# Patient Record
Sex: FEMALE | Race: WHITE | Employment: FULL TIME | URBAN - METROPOLITAN AREA
[De-identification: names, ages, dates, MRNs, and addresses within clinical notes are randomized per-mention and may not be internally consistent; named-entity substitution may affect disease eponyms.]

---

## 2019-11-23 ENCOUNTER — APPOINTMENT (OUTPATIENT)
Dept: ULTRASOUND IMAGING | Age: 24
End: 2019-11-23
Attending: EMERGENCY MEDICINE
Payer: COMMERCIAL

## 2019-11-23 ENCOUNTER — HOSPITAL ENCOUNTER (EMERGENCY)
Age: 24
Discharge: HOME | End: 2019-11-24
Attending: EMERGENCY MEDICINE
Payer: COMMERCIAL

## 2019-11-23 DIAGNOSIS — O20.0 THREATENED MISCARRIAGE: Primary | ICD-10-CM

## 2019-11-23 LAB
APPEARANCE UR: CLEAR
BACTERIA URNS QL MICRO: NEGATIVE /HPF
BASOPHILS # BLD: 0.1 K/UL (ref 0–0.1)
BASOPHILS NFR BLD: 1 % (ref 0–1)
BILIRUB UR QL: NEGATIVE
CLUE CELLS VAG QL WET PREP: NORMAL
COLOR UR: ABNORMAL
DIFFERENTIAL METHOD BLD: NORMAL
EOSINOPHIL # BLD: 0.1 K/UL (ref 0–0.4)
EOSINOPHIL NFR BLD: 1 % (ref 0–7)
EPITH CASTS URNS QL MICRO: ABNORMAL /LPF
ERYTHROCYTE [DISTWIDTH] IN BLOOD BY AUTOMATED COUNT: 13.9 % (ref 11.5–14.5)
GLUCOSE UR STRIP.AUTO-MCNC: NEGATIVE MG/DL
HCG SERPL-ACNC: 9184 MIU/ML (ref 0–6)
HCT VFR BLD AUTO: 38.9 % (ref 35–47)
HGB BLD-MCNC: 12.6 G/DL (ref 11.5–16)
HGB UR QL STRIP: ABNORMAL
HYALINE CASTS URNS QL MICRO: ABNORMAL /LPF (ref 0–5)
IMM GRANULOCYTES # BLD AUTO: 0 K/UL (ref 0–0.04)
IMM GRANULOCYTES NFR BLD AUTO: 0 % (ref 0–0.5)
KETONES UR QL STRIP.AUTO: NEGATIVE MG/DL
KOH PREP SPEC: NORMAL
LEUKOCYTE ESTERASE UR QL STRIP.AUTO: NEGATIVE
LYMPHOCYTES # BLD: 3 K/UL (ref 0.8–3.5)
LYMPHOCYTES NFR BLD: 30 % (ref 12–49)
MCH RBC QN AUTO: 26.8 PG (ref 26–34)
MCHC RBC AUTO-ENTMCNC: 32.4 G/DL (ref 30–36.5)
MCV RBC AUTO: 82.6 FL (ref 80–99)
MONOCYTES # BLD: 0.5 K/UL (ref 0–1)
MONOCYTES NFR BLD: 5 % (ref 5–13)
NEUTS SEG # BLD: 6.5 K/UL (ref 1.8–8)
NEUTS SEG NFR BLD: 63 % (ref 32–75)
NITRITE UR QL STRIP.AUTO: NEGATIVE
NRBC # BLD: 0 K/UL (ref 0–0.01)
NRBC BLD-RTO: 0 PER 100 WBC
PH UR STRIP: 6 [PH] (ref 5–8)
PLATELET # BLD AUTO: 363 K/UL (ref 150–400)
PMV BLD AUTO: 10.3 FL (ref 8.9–12.9)
PROT UR STRIP-MCNC: NEGATIVE MG/DL
RBC # BLD AUTO: 4.71 M/UL (ref 3.8–5.2)
RBC #/AREA URNS HPF: ABNORMAL /HPF (ref 0–5)
SERVICE CMNT-IMP: NORMAL
SP GR UR REFRACTOMETRY: 1.03 (ref 1–1.03)
T VAGINALIS VAG QL WET PREP: NORMAL
UR CULT HOLD, URHOLD: NORMAL
UROBILINOGEN UR QL STRIP.AUTO: 1 EU/DL (ref 0.2–1)
WBC # BLD AUTO: 10.3 K/UL (ref 3.6–11)
WBC URNS QL MICRO: ABNORMAL /HPF (ref 0–4)

## 2019-11-23 PROCEDURE — 99283 EMERGENCY DEPT VISIT LOW MDM: CPT

## 2019-11-23 PROCEDURE — 81001 URINALYSIS AUTO W/SCOPE: CPT

## 2019-11-23 PROCEDURE — 76817 TRANSVAGINAL US OBSTETRIC: CPT

## 2019-11-23 PROCEDURE — 87210 SMEAR WET MOUNT SALINE/INK: CPT

## 2019-11-23 PROCEDURE — 36415 COLL VENOUS BLD VENIPUNCTURE: CPT

## 2019-11-23 PROCEDURE — 76801 OB US < 14 WKS SINGLE FETUS: CPT

## 2019-11-23 PROCEDURE — 86900 BLOOD TYPING SEROLOGIC ABO: CPT

## 2019-11-23 PROCEDURE — 87491 CHLMYD TRACH DNA AMP PROBE: CPT

## 2019-11-23 PROCEDURE — 85025 COMPLETE CBC W/AUTO DIFF WBC: CPT

## 2019-11-23 PROCEDURE — 84702 CHORIONIC GONADOTROPIN TEST: CPT

## 2019-11-23 RX ORDER — SODIUM CHLORIDE 0.9 % (FLUSH) 0.9 %
5-40 SYRINGE (ML) INJECTION EVERY 8 HOURS
Status: DISCONTINUED | OUTPATIENT
Start: 2019-11-23 | End: 2019-11-24 | Stop reason: HOSPADM

## 2019-11-23 RX ORDER — SODIUM CHLORIDE 0.9 % (FLUSH) 0.9 %
5-40 SYRINGE (ML) INJECTION AS NEEDED
Status: DISCONTINUED | OUTPATIENT
Start: 2019-11-23 | End: 2019-11-24 | Stop reason: HOSPADM

## 2019-11-24 VITALS
HEART RATE: 105 BPM | DIASTOLIC BLOOD PRESSURE: 57 MMHG | OXYGEN SATURATION: 98 % | TEMPERATURE: 99.6 F | HEIGHT: 66 IN | WEIGHT: 235 LBS | RESPIRATION RATE: 18 BRPM | BODY MASS INDEX: 37.77 KG/M2 | SYSTOLIC BLOOD PRESSURE: 125 MMHG

## 2019-11-24 LAB
ABO + RH BLD: NORMAL
BLOOD BANK CMNT PATIENT-IMP: NORMAL

## 2019-11-24 NOTE — ED TRIAGE NOTES
Patient is 6 wks preg. States that she started cramping 2 days ago, with it getting progressively worse, and today states that she started bleeding. States that she was spotting a couple of days ago, but a couple of hours ago she had bright red blood when she went to the restroom.

## 2019-11-24 NOTE — ED PROVIDER NOTES
G1, ; healthy; 6 weeks pregnant; presents accompanied by her fiancé with complaints of lower abdominal cramping and vaginal bleeding. She states she has had mild cramping for the past week, but it worsened last night. It is in her lower abdomen (right lower quadrant greater than left lower quadrant). She states that she has been spotting for the past 4 days. However, she had a biopsy done 4 days ago and soon the spotting was due to that. However, she noted some bright red bleeding tonight after wiping. She has not had an ultrasound this pregnancy. No past medical history on file. No past surgical history on file. No family history on file.     Social History     Socioeconomic History    Marital status: SINGLE     Spouse name: Not on file    Number of children: Not on file    Years of education: Not on file    Highest education level: Not on file   Occupational History    Not on file   Social Needs    Financial resource strain: Not on file    Food insecurity:     Worry: Not on file     Inability: Not on file    Transportation needs:     Medical: Not on file     Non-medical: Not on file   Tobacco Use    Smoking status: Not on file   Substance and Sexual Activity    Alcohol use: Not on file    Drug use: Not on file    Sexual activity: Not on file   Lifestyle    Physical activity:     Days per week: Not on file     Minutes per session: Not on file    Stress: Not on file   Relationships    Social connections:     Talks on phone: Not on file     Gets together: Not on file     Attends Yazdanism service: Not on file     Active member of club or organization: Not on file     Attends meetings of clubs or organizations: Not on file     Relationship status: Not on file    Intimate partner violence:     Fear of current or ex partner: Not on file     Emotionally abused: Not on file     Physically abused: Not on file     Forced sexual activity: Not on file   Other Topics Concern    Not on file   Social History Narrative    Not on file         ALLERGIES: Tramadol    Review of Systems   All other systems reviewed and are negative. Vitals:    11/23/19 2206   BP: 152/78   Pulse: (!) 105   Resp: 18   Temp: 99.6 °F (37.6 °C)   SpO2: 97%   Weight: 106.6 kg (235 lb)   Height: 5' 6\" (1.676 m)            Physical Exam  Vitals signs and nursing note reviewed. Constitutional:       Appearance: She is well-developed. Comments: Overweight   HENT:      Head: Normocephalic and atraumatic. Eyes:      Conjunctiva/sclera: Conjunctivae normal.   Neck:      Musculoskeletal: Neck supple. Trachea: No tracheal deviation. Cardiovascular:      Rate and Rhythm: Normal rate and regular rhythm. Heart sounds: Normal heart sounds. No murmur. No friction rub. No gallop. Pulmonary:      Effort: Pulmonary effort is normal.      Breath sounds: Normal breath sounds. Abdominal:      Palpations: Abdomen is soft. Comments: Mild lower abdominal tenderness   Musculoskeletal:         General: No deformity. Skin:     General: Skin is warm and dry. Neurological:      Mental Status: She is alert. Comments: oriented     Pelvic exam: Very minimal amount of brown blood noted in the vault. Cervix is closed and normal-appearing. Mild uterine tenderness. MDM       Procedures    Progress Note:  Results, treatment, and follow up plan have been discussed with patient/fiancé. Questions were answered. Curly Acevedo MD  12:39 AM    Assessment/plan: 6 weeks pregnant presents with cramping and some mild bleeding -threatened miscarriage; quant is 9100; ultrasound shows 6 weeks IUP; a positive blood type; reassuring appearance and exam; VSS; OB follow-up.   Curly Acevedo MD  12:41 AM

## 2019-11-24 NOTE — DISCHARGE INSTRUCTIONS
Patient Education        Threatened Miscarriage: Care Instructions  Your Care Instructions    Some women have light spotting or bleeding during the first 12 weeks of pregnancy. In some cases this is normal. Light spotting or bleeding can also be a sign of a possible loss of the pregnancy. This is called a threatened miscarriage. At this point, the doctor may not be able to tell if your vaginal bleeding is normal or is a sign of a miscarriage. In early pregnancy, things such as stress, exercise, and sex do not cause miscarriage. You may be worried or upset about the possibility of losing your pregnancy. But do not blame yourself. There is no treatment to stop a threatened miscarriage. If you do have a miscarriage, there was nothing you could have done to prevent it. A miscarriage usually means that the pregnancy is not developing normally. The doctor has checked you carefully, but problems can develop later. If you notice any problems or new symptoms, get medical treatment right away. Follow-up care is a key part of your treatment and safety. Be sure to make and go to all appointments, and call your doctor if you are having problems. It's also a good idea to know your test results and keep a list of the medicines you take. How can you care for yourself at home? · If you do have a miscarriage, you will probably have some vaginal bleeding for 1 to 2 weeks. Use pads instead of tampons. · Take acetaminophen (Tylenol) for cramps. Read and follow all instructions on the label. · Do not take two or more pain medicines at the same time unless the doctor told you to. Many pain medicines have acetaminophen, which is Tylenol. Too much acetaminophen (Tylenol) can be harmful. · Do not have sex until your doctor says it is okay. · Get lots of rest over the next several days. · You may do your normal activities if you feel well enough to do them. But do not do any heavy exercise until your doctor says it is okay.   · Eat a balanced diet that is high in iron and vitamin C. Foods rich in iron include red meat, shellfish, eggs, beans, and leafy green vegetables. Foods high in vitamin C include citrus fruits, tomatoes, and broccoli. Talk to your doctor about whether you need to take iron pills or a multivitamin. · Do not drink alcohol or use tobacco or illegal drugs. · Do not smoke. If you need help quitting, talk to your doctor about stop-smoking programs and medicines. These can increase your chances of quitting for good. When should you call for help? Call 911 anytime you think you may need emergency care. For example, call if:    · You passed out (lost consciousness).    Call your doctor now or seek immediate medical care if:    · You have severe vaginal bleeding.     · You are dizzy or lightheaded, or you feel like you may faint.     · You have new or worse pain in your belly or pelvis.     · You have a fever.     · You have vaginal discharge that smells bad.    Watch closely for changes in your health, and be sure to contact your doctor if:    · You do not get better as expected. Where can you learn more? Go to http://onah-jurgen.info/. Enter K269 in the search box to learn more about \"Threatened Miscarriage: Care Instructions. \"  Current as of: May 29, 2019  Content Version: 12.2  © 4608-9520 [employer]. Care instructions adapted under license by [employer] (which disclaims liability or warranty for this information). If you have questions about a medical condition or this instruction, always ask your healthcare professional. [place] [address] any warranty or liability for your use of this information.

## 2019-11-25 LAB
C TRACH DNA SPEC QL NAA+PROBE: NEGATIVE
N GONORRHOEA DNA SPEC QL NAA+PROBE: NEGATIVE
SAMPLE TYPE: NORMAL
SERVICE CMNT-IMP: NORMAL
SPECIMEN SOURCE: NORMAL

## 2024-08-19 ENCOUNTER — TRANSCRIBE ORDERS (OUTPATIENT)
Facility: HOSPITAL | Age: 29
End: 2024-08-19

## 2024-08-19 DIAGNOSIS — H47.10 CHOKED DISC: Primary | ICD-10-CM

## 2024-08-19 DIAGNOSIS — H57.11 PAIN IN RIGHT EYE: ICD-10-CM

## 2024-08-20 ENCOUNTER — HOSPITAL ENCOUNTER (OUTPATIENT)
Facility: HOSPITAL | Age: 29
Discharge: HOME | End: 2024-08-23
Attending: STUDENT IN AN ORGANIZED HEALTH CARE EDUCATION/TRAINING PROGRAM
Payer: COMMERCIAL

## 2024-08-20 DIAGNOSIS — H57.11 PAIN IN RIGHT EYE: ICD-10-CM

## 2024-08-20 DIAGNOSIS — H47.10 CHOKED DISC: ICD-10-CM

## 2024-08-20 PROCEDURE — A9579 GAD-BASE MR CONTRAST NOS,1ML: HCPCS | Performed by: STUDENT IN AN ORGANIZED HEALTH CARE EDUCATION/TRAINING PROGRAM

## 2024-08-20 PROCEDURE — 6360000004 HC RX CONTRAST MEDICATION: Performed by: STUDENT IN AN ORGANIZED HEALTH CARE EDUCATION/TRAINING PROGRAM

## 2024-08-20 PROCEDURE — 70543 MRI ORBT/FAC/NCK W/O &W/DYE: CPT

## 2024-08-20 RX ADMIN — GADOTERIDOL 20 ML: 279.3 INJECTION, SOLUTION INTRAVENOUS at 14:48

## 2024-08-22 ENCOUNTER — APPOINTMENT (OUTPATIENT)
Facility: HOSPITAL | Age: 29
DRG: 123 | End: 2024-08-22
Payer: COMMERCIAL

## 2024-08-22 ENCOUNTER — HOSPITAL ENCOUNTER (INPATIENT)
Facility: HOSPITAL | Age: 29
LOS: 4 days | Discharge: HOME | DRG: 123 | End: 2024-08-26
Attending: EMERGENCY MEDICINE | Admitting: FAMILY MEDICINE
Payer: COMMERCIAL

## 2024-08-22 DIAGNOSIS — H46.9 OPTIC NEURITIS: Primary | ICD-10-CM

## 2024-08-22 LAB
ALBUMIN SERPL-MCNC: 3.9 G/DL (ref 3.5–5)
ALBUMIN/GLOB SERPL: 0.9 (ref 1.1–2.2)
ALP SERPL-CCNC: 66 U/L (ref 45–117)
ALT SERPL-CCNC: 38 U/L (ref 12–78)
ANION GAP SERPL CALC-SCNC: 4 MMOL/L (ref 5–15)
AST SERPL-CCNC: 9 U/L (ref 15–37)
BASOPHILS # BLD: 0.1 K/UL (ref 0–0.1)
BASOPHILS NFR BLD: 1 % (ref 0–1)
BILIRUB SERPL-MCNC: 0.2 MG/DL (ref 0.2–1)
BUN SERPL-MCNC: 15 MG/DL (ref 6–20)
BUN/CREAT SERPL: 19 (ref 12–20)
CALCIUM SERPL-MCNC: 9.7 MG/DL (ref 8.5–10.1)
CHLORIDE SERPL-SCNC: 105 MMOL/L (ref 97–108)
CO2 SERPL-SCNC: 26 MMOL/L (ref 21–32)
COMMENT:: NORMAL
COMMENT:: NORMAL
CREAT SERPL-MCNC: 0.78 MG/DL (ref 0.55–1.02)
CRP SERPL-MCNC: 0.4 MG/DL (ref 0–0.3)
DIFFERENTIAL METHOD BLD: NORMAL
EOSINOPHIL # BLD: 0.1 K/UL (ref 0–0.4)
EOSINOPHIL NFR BLD: 1 % (ref 0–7)
ERYTHROCYTE [DISTWIDTH] IN BLOOD BY AUTOMATED COUNT: 13 % (ref 11.5–14.5)
ERYTHROCYTE [SEDIMENTATION RATE] IN BLOOD: 17 MM/HR (ref 0–20)
GLOBULIN SER CALC-MCNC: 4.2 G/DL (ref 2–4)
GLUCOSE SERPL-MCNC: 97 MG/DL (ref 65–100)
HCG UR QL: NEGATIVE
HCT VFR BLD AUTO: 40.8 % (ref 35–47)
HGB BLD-MCNC: 13.5 G/DL (ref 11.5–16)
IMM GRANULOCYTES # BLD AUTO: 0 K/UL (ref 0–0.04)
IMM GRANULOCYTES NFR BLD AUTO: 0 % (ref 0–0.5)
LYMPHOCYTES # BLD: 1.9 K/UL (ref 0.8–3.5)
LYMPHOCYTES NFR BLD: 18 % (ref 12–49)
MCH RBC QN AUTO: 28 PG (ref 26–34)
MCHC RBC AUTO-ENTMCNC: 33.1 G/DL (ref 30–36.5)
MCV RBC AUTO: 84.5 FL (ref 80–99)
MONOCYTES # BLD: 0.6 K/UL (ref 0–1)
MONOCYTES NFR BLD: 6 % (ref 5–13)
NEUTS SEG # BLD: 7.9 K/UL (ref 1.8–8)
NEUTS SEG NFR BLD: 74 % (ref 32–75)
NRBC # BLD: 0 K/UL (ref 0–0.01)
NRBC BLD-RTO: 0 PER 100 WBC
PLATELET # BLD AUTO: 368 K/UL (ref 150–400)
PMV BLD AUTO: 10.5 FL (ref 8.9–12.9)
POTASSIUM SERPL-SCNC: 4.1 MMOL/L (ref 3.5–5.1)
PROT SERPL-MCNC: 8.1 G/DL (ref 6.4–8.2)
RBC # BLD AUTO: 4.83 M/UL (ref 3.8–5.2)
SODIUM SERPL-SCNC: 135 MMOL/L (ref 136–145)
SPECIMEN HOLD: NORMAL
WBC # BLD AUTO: 10.6 K/UL (ref 3.6–11)

## 2024-08-22 PROCEDURE — 6360000004 HC RX CONTRAST MEDICATION: Performed by: STUDENT IN AN ORGANIZED HEALTH CARE EDUCATION/TRAINING PROGRAM

## 2024-08-22 PROCEDURE — 6360000002 HC RX W HCPCS: Performed by: NURSE PRACTITIONER

## 2024-08-22 PROCEDURE — 80053 COMPREHEN METABOLIC PANEL: CPT

## 2024-08-22 PROCEDURE — 85025 COMPLETE CBC W/AUTO DIFF WBC: CPT

## 2024-08-22 PROCEDURE — 83036 HEMOGLOBIN GLYCOSYLATED A1C: CPT

## 2024-08-22 PROCEDURE — 85652 RBC SED RATE AUTOMATED: CPT

## 2024-08-22 PROCEDURE — 86140 C-REACTIVE PROTEIN: CPT

## 2024-08-22 PROCEDURE — 72156 MRI NECK SPINE W/O & W/DYE: CPT

## 2024-08-22 PROCEDURE — 36415 COLL VENOUS BLD VENIPUNCTURE: CPT

## 2024-08-22 PROCEDURE — 1100000000 HC RM PRIVATE

## 2024-08-22 PROCEDURE — 81025 URINE PREGNANCY TEST: CPT

## 2024-08-22 PROCEDURE — 2580000003 HC RX 258: Performed by: NURSE PRACTITIONER

## 2024-08-22 PROCEDURE — 99285 EMERGENCY DEPT VISIT HI MDM: CPT

## 2024-08-22 PROCEDURE — 72157 MRI CHEST SPINE W/O & W/DYE: CPT

## 2024-08-22 PROCEDURE — A9579 GAD-BASE MR CONTRAST NOS,1ML: HCPCS | Performed by: STUDENT IN AN ORGANIZED HEALTH CARE EDUCATION/TRAINING PROGRAM

## 2024-08-22 RX ORDER — SODIUM CHLORIDE 0.9 % (FLUSH) 0.9 %
5-40 SYRINGE (ML) INJECTION EVERY 12 HOURS SCHEDULED
Status: DISCONTINUED | OUTPATIENT
Start: 2024-08-22 | End: 2024-08-26 | Stop reason: HOSPADM

## 2024-08-22 RX ORDER — ACETAMINOPHEN 650 MG/1
650 SUPPOSITORY RECTAL EVERY 6 HOURS PRN
Status: DISCONTINUED | OUTPATIENT
Start: 2024-08-22 | End: 2024-08-26 | Stop reason: HOSPADM

## 2024-08-22 RX ORDER — ACETAMINOPHEN 325 MG/1
650 TABLET ORAL EVERY 6 HOURS PRN
Status: DISCONTINUED | OUTPATIENT
Start: 2024-08-22 | End: 2024-08-26 | Stop reason: HOSPADM

## 2024-08-22 RX ORDER — ONDANSETRON 4 MG/1
4 TABLET, ORALLY DISINTEGRATING ORAL EVERY 8 HOURS PRN
Status: DISCONTINUED | OUTPATIENT
Start: 2024-08-22 | End: 2024-08-26 | Stop reason: HOSPADM

## 2024-08-22 RX ORDER — PSEUDOEPHEDRINE HCL 120 MG/1
120 TABLET, FILM COATED, EXTENDED RELEASE ORAL PRN
COMMUNITY

## 2024-08-22 RX ORDER — ENOXAPARIN SODIUM 100 MG/ML
30 INJECTION SUBCUTANEOUS 2 TIMES DAILY
Status: DISCONTINUED | OUTPATIENT
Start: 2024-08-22 | End: 2024-08-26 | Stop reason: HOSPADM

## 2024-08-22 RX ORDER — POLYETHYLENE GLYCOL 3350 17 G/17G
17 POWDER, FOR SOLUTION ORAL DAILY PRN
Status: DISCONTINUED | OUTPATIENT
Start: 2024-08-22 | End: 2024-08-26 | Stop reason: HOSPADM

## 2024-08-22 RX ORDER — SODIUM CHLORIDE 0.9 % (FLUSH) 0.9 %
5-40 SYRINGE (ML) INJECTION 2 TIMES DAILY
Status: DISCONTINUED | OUTPATIENT
Start: 2024-08-22 | End: 2024-08-26 | Stop reason: HOSPADM

## 2024-08-22 RX ORDER — ONDANSETRON 2 MG/ML
4 INJECTION INTRAMUSCULAR; INTRAVENOUS EVERY 6 HOURS PRN
Status: DISCONTINUED | OUTPATIENT
Start: 2024-08-22 | End: 2024-08-26 | Stop reason: HOSPADM

## 2024-08-22 RX ORDER — SODIUM CHLORIDE 9 MG/ML
INJECTION, SOLUTION INTRAVENOUS PRN
Status: DISCONTINUED | OUTPATIENT
Start: 2024-08-22 | End: 2024-08-26 | Stop reason: HOSPADM

## 2024-08-22 RX ORDER — NARATRIPTAN 2.5 MG/1
2.5 TABLET ORAL PRN
COMMUNITY

## 2024-08-22 RX ORDER — SODIUM CHLORIDE 0.9 % (FLUSH) 0.9 %
5-40 SYRINGE (ML) INJECTION PRN
Status: DISCONTINUED | OUTPATIENT
Start: 2024-08-22 | End: 2024-08-26 | Stop reason: HOSPADM

## 2024-08-22 RX ADMIN — GADOTERIDOL 20 ML: 279.3 INJECTION, SOLUTION INTRAVENOUS at 22:35

## 2024-08-22 RX ADMIN — SODIUM CHLORIDE, PRESERVATIVE FREE 10 ML: 5 INJECTION INTRAVENOUS at 20:20

## 2024-08-22 RX ADMIN — METHYLPREDNISOLONE SODIUM SUCCINATE 1000 MG: 1 INJECTION INTRAMUSCULAR; INTRAVENOUS at 17:28

## 2024-08-22 RX ADMIN — ENOXAPARIN SODIUM 30 MG: 100 INJECTION SUBCUTANEOUS at 20:16

## 2024-08-22 RX ADMIN — PANTOPRAZOLE SODIUM 40 MG: 40 INJECTION, POWDER, FOR SOLUTION INTRAVENOUS at 17:17

## 2024-08-22 RX ADMIN — SODIUM CHLORIDE, PRESERVATIVE FREE 10 ML: 5 INJECTION INTRAVENOUS at 22:35

## 2024-08-22 ASSESSMENT — PAIN - FUNCTIONAL ASSESSMENT
PAIN_FUNCTIONAL_ASSESSMENT: ACTIVITIES ARE NOT PREVENTED
PAIN_FUNCTIONAL_ASSESSMENT: 0-10
PAIN_FUNCTIONAL_ASSESSMENT: 0-10
PAIN_FUNCTIONAL_ASSESSMENT: ACTIVITIES ARE NOT PREVENTED

## 2024-08-22 ASSESSMENT — PAIN SCALES - GENERAL
PAINLEVEL_OUTOF10: 5
PAINLEVEL_OUTOF10: 7
PAINLEVEL_OUTOF10: 4

## 2024-08-22 ASSESSMENT — PAIN DESCRIPTION - ORIENTATION
ORIENTATION: RIGHT
ORIENTATION: ANTERIOR;RIGHT
ORIENTATION: POSTERIOR

## 2024-08-22 ASSESSMENT — PAIN DESCRIPTION - LOCATION
LOCATION: EYE
LOCATION: HEAD
LOCATION: EYE;HEAD

## 2024-08-22 ASSESSMENT — PAIN DESCRIPTION - DESCRIPTORS
DESCRIPTORS: SHARP
DESCRIPTORS: ACHING
DESCRIPTORS: SHARP;PRESSURE

## 2024-08-22 ASSESSMENT — PAIN DESCRIPTION - PAIN TYPE: TYPE: ACUTE PAIN

## 2024-08-22 ASSESSMENT — PAIN DESCRIPTION - ONSET: ONSET: ON-GOING

## 2024-08-22 ASSESSMENT — PAIN DESCRIPTION - FREQUENCY: FREQUENCY: CONTINUOUS

## 2024-08-22 NOTE — ED PROVIDER NOTES
[facility] EMERGENCY DEP  EMERGENCY DEPARTMENT ENCOUNTER      Pt Name: Ariadna Ardon  MRN: [id number]  Birthdate 1995  Date of evaluation: 8/22/2024  Provider: Hank Simms MD    CHIEF COMPLAINT       Chief Complaint   Patient presents with    Abnormal Test Results         HISTORY OF PRESENT ILLNESS    This is a 29-year-old female with a history of anxiety and hypoglycemia.  She states that over the last 3 weeks she has been having some pain in her right eye particularly if she looks left or right.  She had some pain that was worse when she looked up and down but that has gotten better.  She has not had much decrease in vision.  The pain was worse and she was seen by neuro-ophthalmology.  They ordered an MRI which was done and the results were called to the patient today.  She was sent here by neuro-ophthalmology because she had findings on MRI suggestive of optic neuritis.  It was felt she needed to be admitted for further evaluation and treatment of the same.            Review of External Medical Records:     Nursing Notes were reviewed.    REVIEW OF SYSTEMS       Review of Systems   Constitutional:  Negative for activity change, chills, fatigue and fever.   HENT:  Negative for congestion, ear pain, rhinorrhea, sinus pressure, sinus pain, sore throat and trouble swallowing.         See HPI   Eyes: Negative.    Respiratory:  Negative for cough, chest tightness, shortness of breath, wheezing and stridor.    Cardiovascular:  Negative for chest pain, palpitations and leg swelling.   Gastrointestinal:  Negative for abdominal pain, blood in stool, diarrhea, nausea and vomiting.   Endocrine: Negative.    Genitourinary:  Negative for decreased urine volume, difficulty urinating, flank pain, frequency and hematuria.   Musculoskeletal:  Negative for back pain, joint swelling and neck pain.   Skin:  Negative for color change, pallor and rash.   Neurological:  Negative for dizziness, syncope, speech difficulty,

## 2024-08-22 NOTE — ED TRIAGE NOTES
Patient was sent over by Opthomologist Neurologist for abnormal MRI from yesterday. Patient is having pain and glare over the right eye. Eye issues have been going on for almost 3 weeks. Nasal drainage since MRI but migraine has been constant throughout the 3 weeks to the same right side.

## 2024-08-22 NOTE — ED TRIAGE NOTES
11:33 AM  I have evaluated the patient as the Provider in Rapid Medical Evaluation (RME). I have reviewed her vital signs and the triage nurse assessment. I have talked with the patient and any available family and advised that I am the provider in triage and have ordered the appropriate study to initiate their work up based on the clinical presentation during my assessment. I have advised that the patient will be accommodated in the Main ED as soon as possible. I have also requested to contact the triage nurse or myself immediately if the patient experiences any changes in their condition during this brief waiting period.    Patient sent by neurophthalmology due to an abnormal MRI. Patient had MRI due to a \"film over her right eye with EOM pain.\" States she has had worsening pain with EOM for three weeks and now headache. Also c/o nasal drainage green and bloody. Took diclofenac with minimal relief.  Gabi Avery, TAHMINA - NP

## 2024-08-22 NOTE — ED NOTES
ED TO INPATIENT SBAR HANDOFF    Patient Name: Ariadna Ardon   :  1995  29 y.o.   MRN:  [id number]  ED Room #:  ER30/30     Situation  Code Status: Full Code   Allergies: Latex and Tramadol  Weight: Patient Vitals for the past 96 hrs (Last 3 readings):   Weight   24 1131 113.7 kg (250 lb 10.6 oz)       Arrived from: home    Chief Complaint:   Chief Complaint   Patient presents with    Abnormal Test Results       Hospital Problem/Diagnosis:  Principal Problem:    Optic neuritis  Resolved Problems:    * No resolved hospital problems. *      Mobility: no current mobility problem   ED Fall Risk: Presents to emergency department  because of falls (Syncope, seizure, or loss of consciousness): No, Age > 70: No, Altered Mental Status, Intoxication with alcohol or substance confusion (Disorientation, impaired judgment, poor safety awaremess, or inability to follow instructions): No, Impaired Mobility: Ambulates or transfers with assistive devices or assistance; Unable to ambulate or transer.: No, Nursing Judgement: No   Fell in ED or prior to admission: no   Restraints: no     Sitter: no   Family/Caregiver Present: no    Neet to know social/safety information: none    Background  History:   Past Medical History:   Diagnosis Date    Anxiety     Hypoglycemia        Assessment    Abnormal Assessment Findings: optic neuritis  Imaging:   MRI CERVICAL SPINE W WO CONTRAST    (Results Pending)   MRI THORACIC SPINE W WO CONTRAST    (Results Pending)     Abnormal labs:   Abnormal Labs Reviewed   COMPREHENSIVE METABOLIC PANEL - Abnormal; Notable for the following components:       Result Value    Sodium 135 (*)     Anion Gap 4 (*)     AST 9 (*)     Globulin 4.2 (*)     Albumin/Globulin Ratio 0.9 (*)     All other components within normal limits   C-REACTIVE PROTEIN - Abnormal; Notable for the following components:    CRP 0.40 (*)     All other components within normal limits       Vitals/MEWS: MEWS Score: 0  Level of

## 2024-08-22 NOTE — H&P
History and Physical    Date of Service:  8/22/2024  Primary Care Provider: Valente Irwin MD  Source of information: The patient and Chart review    Chief Complaint: Abnormal Test Results      History of Presenting Illness:   Ariadna Ardon is a 29 y.o. female who presents w/ PMH of anxiety and hypoglycemia.  Patient reports right eye pain x 3 weeks particularly when she looks left or right.  Patient additionally had pain looking up or down but that has improved.  Mild right sided blurry vision. No vision changes in left eye. Patient was seen by neuro-ophthalmology and an MRI of the orbits was ordered.  When results were received patient was advised to go to the ER.  MRI is suggestive of optic neuritis.  Received test, advised patient we will obtain MRI of C-spine.    Patient denies heavy legs, tremors, difficulty walking, involuntary movements, muscle weakness, clumsiness, dizziness, pins-and-needles or slurred speech.  Patient does report stiff muscles for years in addition to knee joint pain for years and reports a spinal injury in the seventh grade but is not clear on what the injury was and recovered after physical therapy.     REVIEW OF SYSTEMS:  A comprehensive review of systems was negative except for that written in the History of Present Illness.     Past Medical History:   Diagnosis Date    Anxiety     Hypoglycemia       Past Surgical History:   Procedure Laterality Date    WISDOM TOOTH EXTRACTION       Prior to Admission medications    Not on File     Allergies   Allergen Reactions    Latex Other (See Comments)    Tramadol       History reviewed. No pertinent family history.   Social History:  reports that she has never smoked. She has never been exposed to tobacco smoke. She has never used smokeless tobacco. She reports that she does not drink alcohol and does not use drugs.   Social Determinants of Health     Tobacco Use: Low Risk  (8/22/2024)    Patient History     Smoking Tobacco Use: Never

## 2024-08-23 PROBLEM — R73.9 STEROID-INDUCED HYPERGLYCEMIA: Status: ACTIVE | Noted: 2024-08-23

## 2024-08-23 PROBLEM — T38.0X5A STEROID-INDUCED HYPERGLYCEMIA: Status: ACTIVE | Noted: 2024-08-23

## 2024-08-23 LAB
ANION GAP SERPL CALC-SCNC: 7 MMOL/L (ref 5–15)
BUN SERPL-MCNC: 13 MG/DL (ref 6–20)
BUN/CREAT SERPL: 15 (ref 12–20)
CALCIUM SERPL-MCNC: 8.9 MG/DL (ref 8.5–10.1)
CHLORIDE SERPL-SCNC: 110 MMOL/L (ref 97–108)
CO2 SERPL-SCNC: 22 MMOL/L (ref 21–32)
CREAT SERPL-MCNC: 0.84 MG/DL (ref 0.55–1.02)
EST. AVERAGE GLUCOSE BLD GHB EST-MCNC: 100 MG/DL
GLUCOSE BLD STRIP.AUTO-MCNC: 143 MG/DL (ref 65–117)
GLUCOSE BLD STRIP.AUTO-MCNC: 202 MG/DL (ref 65–117)
GLUCOSE SERPL-MCNC: 203 MG/DL (ref 65–100)
HBA1C MFR BLD: 5.1 % (ref 4–5.6)
POTASSIUM SERPL-SCNC: 4.2 MMOL/L (ref 3.5–5.1)
SERVICE CMNT-IMP: ABNORMAL
SERVICE CMNT-IMP: ABNORMAL
SODIUM SERPL-SCNC: 139 MMOL/L (ref 136–145)

## 2024-08-23 PROCEDURE — 82962 GLUCOSE BLOOD TEST: CPT

## 2024-08-23 PROCEDURE — 99223 1ST HOSP IP/OBS HIGH 75: CPT | Performed by: NURSE PRACTITIONER

## 2024-08-23 PROCEDURE — 86381 MITOCHONDRIAL ANTIBODY EACH: CPT

## 2024-08-23 PROCEDURE — 2580000003 HC RX 258: Performed by: STUDENT IN AN ORGANIZED HEALTH CARE EDUCATION/TRAINING PROGRAM

## 2024-08-23 PROCEDURE — 80048 BASIC METABOLIC PNL TOTAL CA: CPT

## 2024-08-23 PROCEDURE — 6370000000 HC RX 637 (ALT 250 FOR IP)

## 2024-08-23 PROCEDURE — 2580000003 HC RX 258: Performed by: NURSE PRACTITIONER

## 2024-08-23 PROCEDURE — 6360000002 HC RX W HCPCS: Performed by: NURSE PRACTITIONER

## 2024-08-23 PROCEDURE — 36415 COLL VENOUS BLD VENIPUNCTURE: CPT

## 2024-08-23 PROCEDURE — 86015 ACTIN ANTIBODY EACH: CPT

## 2024-08-23 PROCEDURE — 1100000000 HC RM PRIVATE

## 2024-08-23 PROCEDURE — 99221 1ST HOSP IP/OBS SF/LOW 40: CPT | Performed by: CLINICAL NURSE SPECIALIST

## 2024-08-23 RX ORDER — INSULIN LISPRO 100 [IU]/ML
0-4 INJECTION, SOLUTION INTRAVENOUS; SUBCUTANEOUS NIGHTLY
Status: DISCONTINUED | OUTPATIENT
Start: 2024-08-23 | End: 2024-08-26 | Stop reason: HOSPADM

## 2024-08-23 RX ORDER — HYDROXYZINE HYDROCHLORIDE 10 MG/1
10 TABLET, FILM COATED ORAL ONCE AS NEEDED
Status: COMPLETED | OUTPATIENT
Start: 2024-08-23 | End: 2024-08-23

## 2024-08-23 RX ORDER — SUMATRIPTAN 25 MG/1
100 TABLET, FILM COATED ORAL ONCE
Status: DISCONTINUED | OUTPATIENT
Start: 2024-08-23 | End: 2024-08-26 | Stop reason: HOSPADM

## 2024-08-23 RX ORDER — PSEUDOEPHEDRINE HCL 30 MG
30 TABLET ORAL EVERY 6 HOURS PRN
Status: DISCONTINUED | OUTPATIENT
Start: 2024-08-23 | End: 2024-08-26 | Stop reason: HOSPADM

## 2024-08-23 RX ORDER — IBUPROFEN 600 MG/1
600 TABLET, FILM COATED ORAL
Status: DISCONTINUED | OUTPATIENT
Start: 2024-08-24 | End: 2024-08-23

## 2024-08-23 RX ORDER — DEXTROSE MONOHYDRATE 100 MG/ML
INJECTION, SOLUTION INTRAVENOUS CONTINUOUS PRN
Status: DISCONTINUED | OUTPATIENT
Start: 2024-08-23 | End: 2024-08-26 | Stop reason: HOSPADM

## 2024-08-23 RX ORDER — DICLOFENAC SODIUM 75 MG/1
75 TABLET, DELAYED RELEASE ORAL 2 TIMES DAILY
Status: DISCONTINUED | OUTPATIENT
Start: 2024-08-23 | End: 2024-08-26 | Stop reason: HOSPADM

## 2024-08-23 RX ORDER — INSULIN LISPRO 100 [IU]/ML
0-8 INJECTION, SOLUTION INTRAVENOUS; SUBCUTANEOUS
Status: DISCONTINUED | OUTPATIENT
Start: 2024-08-23 | End: 2024-08-26 | Stop reason: HOSPADM

## 2024-08-23 RX ADMIN — PANTOPRAZOLE SODIUM 40 MG: 40 INJECTION, POWDER, FOR SOLUTION INTRAVENOUS at 11:32

## 2024-08-23 RX ADMIN — HYDROXYZINE HYDROCHLORIDE 10 MG: 10 TABLET ORAL at 20:39

## 2024-08-23 RX ADMIN — METHYLPREDNISOLONE SODIUM SUCCINATE 1000 MG: 1 INJECTION INTRAMUSCULAR; INTRAVENOUS at 11:50

## 2024-08-23 RX ADMIN — ENOXAPARIN SODIUM 30 MG: 100 INJECTION SUBCUTANEOUS at 20:39

## 2024-08-23 RX ADMIN — SODIUM CHLORIDE, PRESERVATIVE FREE 10 ML: 5 INJECTION INTRAVENOUS at 20:40

## 2024-08-23 RX ADMIN — ENOXAPARIN SODIUM 30 MG: 100 INJECTION SUBCUTANEOUS at 11:31

## 2024-08-23 ASSESSMENT — PAIN SCALES - GENERAL
PAINLEVEL_OUTOF10: 0

## 2024-08-23 NOTE — CONSULTS
[facility]  DIABETES MANAGEMENT CONSULT    Consulted by Provider for advanced nursing evaluation and care for inpatient blood glucose management while receiving high dose IV steroid course.    Evaluation and Action Plan   Ariadna Ardon is a 29 y.o. female admitted  with concern for optic neuritis per MRI and work up. Treatment consisting of high dose IV steroid course with taper.  Patient with no prior history of diabetes per EHR. A1C from Oct. 2023, 5.5%.  Will obtain another current A1C to assess diabetes progression.  Has risk factors for diabetes given BMI/ and strong  family history \"on both sides\" per her report.   S/p one dose of Methylprednisolone IV, 1000mg last evening,  with fasting .       Blood glucose pattern    Significant diabetes-related events over the past 24-72 hours  Fasting B  Pre-prandial: 143  Basal: NA  Bolus: NA  Correction: Medium sensitivity ACHS    : may need insulin therapy to blunt high dose steroid effect- Fasting lab BG >200.  Will initiate corrective insulin as per below to assess needs and effect. Since coming up on weekend, please follow guidance below if BG continue to rise while on IV steroid course.    If basal insulin is needed, recommend use of NPH at 0.1-0.2u/kg dosing to start timed with steroid doses. Will order POC blood glucose check ACHS -   Steroid course is as follows: IV solumedrol 1 gram x 3 days, then can decrease to 1mg/kg x 11 days with a 4 day taper     Management Rationale Action Plan   Medication   Corrective insulin Using MEDIUM sensitivity  ACHS   Overriding steroid effect Steroid Equivalency for insulin dosing   Insulin dosing/steroid dose Methylprednisolone Prednisone   0.4 units/kg 36mg + 40mg   0.3 units/kg 24mg 30mg   0.2 units/kg  16mg 20mg   0.1 units/kg 8mg 10mg       Lab [x]        Hemoglobin [id number] as add on     Additional orders  Carb consistent diet (60g CHO/meal)  POC ACHS       Diabetes

## 2024-08-23 NOTE — PROGRESS NOTES
Hospitalist Progress Note  Nicolas Lombardi MD  Answering service: [phone] OR [phone] from in house phone        Date of Service:  2024  NAME:  Ariadna Ardon  :  1995  MRN:  [id number]      Admission Summary:   Ariadna Ardon is a 29 y.o. female who presents w/ PMH of anxiety and hypoglycemia.  Patient reports right eye pain x 3 weeks particularly when she looks left or right.  Patient additionally had pain looking up or down but that has improved.  Mild right sided blurry vision. No vision changes in left eye. Patient was seen by neuro-ophthalmology and an MRI of the orbits was ordered.  When results were received patient was advised to go to the ER.  MRI is suggestive of optic neuritis.  Received test, advised patient we will obtain MRI of C-spine        Interval history / Subjective:   F/u for optic neuritis, unclear etiology  Seen by neurology w/u added  Pt thinks her vision improved      Assessment & Plan:      Optic Neuritis  - Mri orbits face neck:   --Probable demyelinating plaques with suggestion of acute demyelination along the periventricular anterior left frontal lobe mild overall.  Clinical correlation and attention on follow-up advised to exclude cerebral vasculitis versus mass lesion.  Findings suggestive of right optic neuritis  - obtain urine pregnancy test  - obtain MRI c-spine with and without-- reviewed no other lesion  - crp mildly elevated at 0.40  - IV solumedrol 1 gram x 5 days,   - consult neurology  - Monitor for hyperglycemia.  DTC consulted  - PPI for GI protection     Code status: Full  Prophylaxis: ambulation  Care Plan discussed with: pt/rn  Anticipated Disposition: tbd     Principal Problem:    Optic neuritis  Active Problems:    Steroid-induced hyperglycemia  Resolved Problems:    * No resolved hospital problems. *            Review of Systems:   A comprehensive review of systems

## 2024-08-23 NOTE — PROGRESS NOTES
Physician Progress Note      PATIENT:               ARIADNA ROBERTO  CSN #:                  [id number]  :                       1995  ADMIT DATE:       2024 12:05 PM  DISCH DATE:  RESPONDING  PROVIDER #:        Nicolas Lombardi MD          QUERY TEXT:    Patient admitted with BMI 43.03 kg/m?. If possible, please document in   progress notes and discharge summary if you are evaluating and /or treating   any of the following:    The medical record reflects the following:  Risk Factors: 29 y.o. female with pmh Hypoglycemia  Clinical Indicators: BMI 43.03 kg/m?  Treatment: Diabetes management consult, Regular; 4 carb choices (60 gm/meal)  Thank you,  Ariadna Arias, BSN, RN, CCRN, CRCR  J[employer]ber: [phone]  I am also available via PS.    Specificity of obesity and morbid obesity should be reported based on   physician documentation, as there are several published classifications and   definitions?  MS-DRG Training Guide. CDC:   https://www.cdc.gov/obesity/basics/adult-defining.html. WHO:   https://www.who.int/news-room/fact-sheets/detail/obesity-and-overweight. NIH:   https://www.nhlbi.nih.gov/health/educational/lose_wt/BMI/bmi_dis.htm  Options provided:  -- Morbid obesity  -- Severe obesity  -- Other - I will add my own diagnosis  -- Disagree - Not applicable / Not valid  -- Disagree - Clinically unable to determine / Unknown  -- Refer to Clinical Documentation Reviewer    PROVIDER RESPONSE TEXT:    This patient has severe obesity.    Query created by: Ariadna Arias on 2024 2:20 PM      Electronically signed by:  Nicolas Lombardi MD 2024 4:24 PM

## 2024-08-23 NOTE — CONSULTS
center part of her R-eye that radiated through the back of her head and down her neck. She had bilateral asymmetric ON edema. There was also concern for IIH as diagnosis and MRI brain and orbits with contrast was ordered and MRV. MRI was done on 8/21/24 and showed findings suggestive of optic neuritis. Patient was subsequently referred to ED. She denies weakness, numbness or tingling. Neurology is asked to consult for optic neuritis.           Allergies   Allergen Reactions    Latex Other (See Comments)    Tramadol         Prior to Admission medications    Medication Sig Start Date End Date Taking? Authorizing Provider   diclofenac sodium (VOLTAREN) 1 % GEL Apply 75 g topically 2 times daily   Yes Joan Joshi MD   naratriptan (AMERGE) 2.5 MG tablet Take 1 tablet by mouth as needed for Migraine 2.5 mg at onset of headache, may repeat in 4 hours if needed   Yes Joan Joshi MD   rimegepant sulfate 75 MG TBDP Take 75 mg by mouth as needed   Yes Joan Joshi MD   pseudoephedrine (SUDAFED 12 HR) 120 MG extended release tablet Take 1 tablet by mouth as needed for Congestion   Yes ProviderJoan MD       Past Medical History:   Diagnosis Date    Anxiety     Hypoglycemia         Past Surgical History:   Procedure Laterality Date    WISDOM TOOTH EXTRACTION          Social History     Tobacco Use    Smoking status: Never     Passive exposure: Never    Smokeless tobacco: Never   Substance Use Topics    Alcohol use: Never        History reviewed. No pertinent family history.       Review of Systems:   Comprehensive review of systems performed and negative except for as listed above.     Exam:     /76   Pulse 90   Temp 98.2 °F (36.8 °C) (Oral)   Resp 14   Ht 1.626 m (5' 4\")   Wt 113.7 kg (250 lb 10.6 oz)   LMP 08/21/2024   SpO2 97%   BMI 43.03 kg/m²      General: Well developed, well nourished. Patient in no apparent distress   Head: Normocephalic, atraumatic, anicteric sclera    Lungs:  No increased WOB   Cardiac: RRR    Ext: No pedal edema or signs of rash             Neurological Exam:  Mental Status: A&Ox3, Follows commands   Speech: Fluent no aphasia or dysarthria.   Cranial Nerves:   VFF.  Snellen OD 20/30 grossly. Facial sensation is normal. Facial movement is symmetric.  Palate is midline.  Normal sternocleidomastoid strength. Tongue is midline. Hearing is intact bilat.    Eyes: PERRL, EOM's full, no nystagmus, no ptosis.   Motor:  5/5 x4. Normal bulk and tone.   Reflexes:   DTR 2+/4 and symmetrical.  Toes downgoing bilat.    Sensory:   Sensation intact to light touch bilat throughout   Gait:  Deferred   Tremor:   No tremor noted.   Cerebellar:  Intact FTN bilat           Imaging  I personally reviewed the following images.   CT Results (maximum last 3):  CT Result (most recent):  No results found for this or any previous visit from the past 3650 days.      MRI Results (maximum last 3):  MRI Result (most recent):  MRI CERVICAL SPINE W WO CONTRAST 08/22/2024    Narrative  EXAM: MRI CERVICAL SPINE W WO CONTRAST    INDICATION: Concern for demyelinating disease. Unspecified optic neuritis    COMPARISON: None    TECHNIQUE: MR imaging of the cervical spine was performed using the following  sequences: sagittal T1, T2, STIR;  axial T2, T1 prior to and following contrast  administration.    CONTRAST: 20 mL of ProHance.    FINDINGS:    There is normal alignment of the cervical spine. Vertebral body heights are  maintained. No fracture. There is an incidental intraosseous hemangioma in C6.    The craniocervical junction is intact. The course, caliber, and signal intensity  of the spinal cord are normal. There is no pathologic intrathecal enhancement.    The paraspinal soft tissues are within normal limits.    C2-C3: No herniation or stenosis.    C3-C4: Mild broad-based disc osteophyte complex with mild spinal stenosis. There  is mild right neural femoral narrowing.    C4-C5: Mild broad-based

## 2024-08-23 NOTE — PROGRESS NOTES
0730: Bedside shift change report given to Paola  (oncoming nurse) by RN (offgoing nurse). Report included the following information Nurse Handoff Report, Index, Intake/Output, and MAR.       1530: Bedside shift change report given to Dashawn (oncoming nurse) by Paola  (offgoing nurse). Report included the following information Nurse Handoff Report, Index, Intake/Output, and MAR.

## 2024-08-24 LAB
GLUCOSE BLD STRIP.AUTO-MCNC: 115 MG/DL (ref 65–117)
GLUCOSE BLD STRIP.AUTO-MCNC: 147 MG/DL (ref 65–117)
GLUCOSE BLD STRIP.AUTO-MCNC: 151 MG/DL (ref 65–117)
GLUCOSE BLD STRIP.AUTO-MCNC: 189 MG/DL (ref 65–117)
SERVICE CMNT-IMP: ABNORMAL
SERVICE CMNT-IMP: NORMAL

## 2024-08-24 PROCEDURE — 6370000000 HC RX 637 (ALT 250 FOR IP)

## 2024-08-24 PROCEDURE — 82962 GLUCOSE BLOOD TEST: CPT

## 2024-08-24 PROCEDURE — 1100000000 HC RM PRIVATE

## 2024-08-24 PROCEDURE — 2580000003 HC RX 258: Performed by: NURSE PRACTITIONER

## 2024-08-24 PROCEDURE — 6360000002 HC RX W HCPCS: Performed by: NURSE PRACTITIONER

## 2024-08-24 PROCEDURE — 2580000003 HC RX 258: Performed by: STUDENT IN AN ORGANIZED HEALTH CARE EDUCATION/TRAINING PROGRAM

## 2024-08-24 PROCEDURE — 6370000000 HC RX 637 (ALT 250 FOR IP): Performed by: NURSE PRACTITIONER

## 2024-08-24 RX ORDER — HYDROXYZINE HYDROCHLORIDE 10 MG/1
10 TABLET, FILM COATED ORAL EVERY 6 HOURS PRN
Status: COMPLETED | OUTPATIENT
Start: 2024-08-24 | End: 2024-08-24

## 2024-08-24 RX ORDER — HYDROXYZINE HYDROCHLORIDE 10 MG/1
10 TABLET, FILM COATED ORAL NIGHTLY PRN
Status: DISCONTINUED | OUTPATIENT
Start: 2024-08-24 | End: 2024-08-26 | Stop reason: HOSPADM

## 2024-08-24 RX ADMIN — METHYLPREDNISOLONE SODIUM SUCCINATE 1000 MG: 1 INJECTION INTRAMUSCULAR; INTRAVENOUS at 08:52

## 2024-08-24 RX ADMIN — PANTOPRAZOLE SODIUM 40 MG: 40 INJECTION, POWDER, FOR SOLUTION INTRAVENOUS at 08:45

## 2024-08-24 RX ADMIN — SODIUM CHLORIDE, PRESERVATIVE FREE 10 ML: 5 INJECTION INTRAVENOUS at 08:46

## 2024-08-24 RX ADMIN — DICLOFENAC SODIUM 75 MG: 75 TABLET, DELAYED RELEASE ORAL at 08:46

## 2024-08-24 RX ADMIN — DICLOFENAC SODIUM 75 MG: 75 TABLET, DELAYED RELEASE ORAL at 21:38

## 2024-08-24 RX ADMIN — ENOXAPARIN SODIUM 30 MG: 100 INJECTION SUBCUTANEOUS at 21:37

## 2024-08-24 RX ADMIN — SODIUM CHLORIDE, PRESERVATIVE FREE 10 ML: 5 INJECTION INTRAVENOUS at 08:45

## 2024-08-24 RX ADMIN — HYDROXYZINE HYDROCHLORIDE 10 MG: 10 TABLET ORAL at 14:55

## 2024-08-24 RX ADMIN — DICLOFENAC SODIUM 75 MG: 75 TABLET, DELAYED RELEASE ORAL at 00:04

## 2024-08-24 RX ADMIN — ENOXAPARIN SODIUM 30 MG: 100 INJECTION SUBCUTANEOUS at 08:46

## 2024-08-24 RX ADMIN — HYDROXYZINE HYDROCHLORIDE 10 MG: 10 TABLET ORAL at 22:24

## 2024-08-24 RX ADMIN — SODIUM CHLORIDE, PRESERVATIVE FREE 10 ML: 5 INJECTION INTRAVENOUS at 21:39

## 2024-08-24 NOTE — PROGRESS NOTES
This RN messaged on call provider regarding pt request for anxiety. On call provider responded and ordered medication.

## 2024-08-25 LAB
GLUCOSE BLD STRIP.AUTO-MCNC: 100 MG/DL (ref 65–117)
GLUCOSE BLD STRIP.AUTO-MCNC: 149 MG/DL (ref 65–117)
GLUCOSE BLD STRIP.AUTO-MCNC: 162 MG/DL (ref 65–117)
GLUCOSE BLD STRIP.AUTO-MCNC: 98 MG/DL (ref 65–117)
SERVICE CMNT-IMP: ABNORMAL
SERVICE CMNT-IMP: ABNORMAL
SERVICE CMNT-IMP: NORMAL
SERVICE CMNT-IMP: NORMAL

## 2024-08-25 PROCEDURE — 2580000003 HC RX 258: Performed by: STUDENT IN AN ORGANIZED HEALTH CARE EDUCATION/TRAINING PROGRAM

## 2024-08-25 PROCEDURE — 6360000002 HC RX W HCPCS: Performed by: NURSE PRACTITIONER

## 2024-08-25 PROCEDURE — 82962 GLUCOSE BLOOD TEST: CPT

## 2024-08-25 PROCEDURE — 2580000003 HC RX 258: Performed by: NURSE PRACTITIONER

## 2024-08-25 PROCEDURE — 6370000000 HC RX 637 (ALT 250 FOR IP)

## 2024-08-25 PROCEDURE — 1100000000 HC RM PRIVATE

## 2024-08-25 RX ADMIN — SODIUM CHLORIDE, PRESERVATIVE FREE 10 ML: 5 INJECTION INTRAVENOUS at 21:48

## 2024-08-25 RX ADMIN — ENOXAPARIN SODIUM 30 MG: 100 INJECTION SUBCUTANEOUS at 09:14

## 2024-08-25 RX ADMIN — DICLOFENAC SODIUM 75 MG: 75 TABLET, DELAYED RELEASE ORAL at 21:42

## 2024-08-25 RX ADMIN — PANTOPRAZOLE SODIUM 40 MG: 40 INJECTION, POWDER, FOR SOLUTION INTRAVENOUS at 09:14

## 2024-08-25 RX ADMIN — ENOXAPARIN SODIUM 30 MG: 100 INJECTION SUBCUTANEOUS at 21:40

## 2024-08-25 RX ADMIN — SODIUM CHLORIDE, PRESERVATIVE FREE 10 ML: 5 INJECTION INTRAVENOUS at 09:14

## 2024-08-25 RX ADMIN — HYDROXYZINE HYDROCHLORIDE 10 MG: 10 TABLET ORAL at 21:40

## 2024-08-25 RX ADMIN — DICLOFENAC SODIUM 75 MG: 75 TABLET, DELAYED RELEASE ORAL at 09:13

## 2024-08-25 RX ADMIN — METHYLPREDNISOLONE SODIUM SUCCINATE 1000 MG: 1 INJECTION INTRAMUSCULAR; INTRAVENOUS at 10:55

## 2024-08-25 NOTE — PROGRESS NOTES
Recent Labs     08/22/24  1154   WBC 10.6   HGB 13.5   HCT 40.8        Recent Labs     08/22/24  1154 08/23/24  0731   * 139   K 4.1 4.2    110*   CO2 26 22   BUN 15 13     Recent Labs     08/22/24  1154   ALT 38   GLOB 4.2*     No results for input(s): \"INR\", \"APTT\" in the last 72 hours.    Invalid input(s): \"PTP\"   No results for input(s): \"TIBC\" in the last 72 hours.    Invalid input(s): \"FE\", \"PSAT\", \"FERR\"   No results found for: \"RBCF\"   No results for input(s): \"PH\", \"PCO2\", \"PO2\" in the last 72 hours.  No results for input(s): \"CPK\" in the last 72 hours.    Invalid input(s): \"CPKMB\", \"CKNDX\", \"TROIQ\"  No results found for: \"CHOL\", \"CHLST\", \"CHOLV\", \"HDL\", \"HDLC\", \"LDL\"  No results found for: \"GLUCPOC\"        Medications Reviewed:     Current Facility-Administered Medications   Medication Dose Route Frequency    hydrOXYzine HCl (ATARAX) tablet 10 mg  10 mg Oral Nightly PRN    glucose chewable tablet 16 g  4 tablet Oral PRN    dextrose bolus 10% 125 mL  125 mL IntraVENous PRN    Or    dextrose bolus 10% 250 mL  250 mL IntraVENous PRN    glucagon injection 1 mg  1 mg SubCUTAneous PRN    dextrose 10 % infusion   IntraVENous Continuous PRN    insulin lispro (HUMALOG,ADMELOG) injection vial 0-8 Units  0-8 Units SubCUTAneous TID WC    insulin lispro (HUMALOG,ADMELOG) injection vial 0-4 Units  0-4 Units SubCUTAneous Nightly    SUMAtriptan (IMITREX) tablet 100 mg  100 mg Oral Once    rimegepant sulfate TBDP 75 mg (Patient Supplied)  75 mg Oral PRN    pseudoephedrine (SUDAFED) tablet 30 mg  30 mg Oral Q6H PRN    diclofenac (VOLTAREN) EC tablet 75 mg (Patient Supplied)  75 mg Oral BID    sodium chloride flush 0.9 % injection 5-40 mL  5-40 mL IntraVENous 2 times per day    sodium chloride flush 0.9 % injection 5-40 mL  5-40 mL IntraVENous PRN    0.9 % sodium chloride infusion   IntraVENous PRN    enoxaparin Sodium (LOVENOX) injection 30 mg  30 mg SubCUTAneous BID    ondansetron (ZOFRAN-ODT)

## 2024-08-25 NOTE — PROGRESS NOTES
This RN messaged on call provider regarding pt request for medication for sinus drainage, has greenish mucus. Will notify day shift RN of message and to follow up

## 2024-08-26 ENCOUNTER — TELEPHONE (OUTPATIENT)
Facility: HOSPITAL | Age: 29
End: 2024-08-26

## 2024-08-26 VITALS
SYSTOLIC BLOOD PRESSURE: 126 MMHG | TEMPERATURE: 98.2 F | HEART RATE: 73 BPM | RESPIRATION RATE: 18 BRPM | HEIGHT: 64 IN | DIASTOLIC BLOOD PRESSURE: 91 MMHG | WEIGHT: 250.66 LBS | OXYGEN SATURATION: 96 % | BODY MASS INDEX: 42.79 KG/M2

## 2024-08-26 LAB
ANTI-MYELIN OLIGODENDROCYTE GLYCOPROTEIN: NEGATIVE
AQP4 H2O CHANNEL IGG SERPL IA-ACNC: <1.5 U/ML (ref 0–3)
GLUCOSE BLD STRIP.AUTO-MCNC: 117 MG/DL (ref 65–117)
GLUCOSE BLD STRIP.AUTO-MCNC: 142 MG/DL (ref 65–117)
SERVICE CMNT-IMP: ABNORMAL
SERVICE CMNT-IMP: NORMAL

## 2024-08-26 PROCEDURE — 2580000003 HC RX 258: Performed by: NURSE PRACTITIONER

## 2024-08-26 PROCEDURE — 99232 SBSQ HOSP IP/OBS MODERATE 35: CPT | Performed by: NURSE PRACTITIONER

## 2024-08-26 PROCEDURE — 6360000002 HC RX W HCPCS: Performed by: NURSE PRACTITIONER

## 2024-08-26 PROCEDURE — 97161 PT EVAL LOW COMPLEX 20 MIN: CPT

## 2024-08-26 PROCEDURE — 2580000003 HC RX 258: Performed by: STUDENT IN AN ORGANIZED HEALTH CARE EDUCATION/TRAINING PROGRAM

## 2024-08-26 PROCEDURE — 82962 GLUCOSE BLOOD TEST: CPT

## 2024-08-26 RX ORDER — HYDROXYZINE HYDROCHLORIDE 10 MG/1
10 TABLET, FILM COATED ORAL NIGHTLY PRN
Qty: 12 TABLET | Refills: 0 | Status: SHIPPED | OUTPATIENT
Start: 2024-08-26 | End: 2024-09-07

## 2024-08-26 RX ORDER — PREDNISONE 10 MG/1
TABLET ORAL
Qty: 10 TABLET | Refills: 0 | Status: SHIPPED | OUTPATIENT
Start: 2024-08-27 | End: 2024-08-27

## 2024-08-26 RX ORDER — PANTOPRAZOLE SODIUM 40 MG/1
40 TABLET, DELAYED RELEASE ORAL
Qty: 30 TABLET | Refills: 0 | Status: SHIPPED | OUTPATIENT
Start: 2024-08-26 | End: 2024-09-25

## 2024-08-26 RX ADMIN — SODIUM CHLORIDE, PRESERVATIVE FREE 10 ML: 5 INJECTION INTRAVENOUS at 09:24

## 2024-08-26 RX ADMIN — PANTOPRAZOLE SODIUM 40 MG: 40 INJECTION, POWDER, FOR SOLUTION INTRAVENOUS at 09:23

## 2024-08-26 RX ADMIN — METHYLPREDNISOLONE SODIUM SUCCINATE 1000 MG: 1 INJECTION INTRAMUSCULAR; INTRAVENOUS at 09:31

## 2024-08-26 NOTE — TELEPHONE ENCOUNTER
Contact patient and was able to josé patient w/ Dr. Seo per NP Alex request.  Will work on sending out paper work for patient to home address, verified with pt

## 2024-08-26 NOTE — DISCHARGE SUMMARY
Discharge Summary       PATIENT ID: Ariadna Ardon  MRN: [id number]   YOB: 1995    DATE OF ADMISSION: 8/22/2024 12:05 PM    DATE OF DISCHARGE: 08/26/2024   PRIMARY CARE PROVIDER: Valente Irwin MD     ATTENDING PHYSICIAN: Dr. Hunter Lara  DISCHARGING PROVIDER: Mila Plunkett PA-C    To contact this individual call [phone] and ask the  to page.  If unavailable ask to be transferred the Adult Hospitalist Department.    CONSULTATIONS: IP CONSULT TO NEUROLOGY  IP CONSULT TO DIABETES MANAGEMENT    PROCEDURES/SURGERIES: * No surgery found *     ADMITTING DIAGNOSES & HOSPITAL COURSE:   Ariadna Ardon is a 29 y.o. female who presented on 8/22/2024 w/ PMH of anxiety and hypoglycemia.  Patient reported right eye pain x 3 weeks particularly when she looked left or right.  Patient additionally had pain looking up or down but that had improved.  Mild right sided blurry vision. No vision changes in left eye. Patient was seen by neuro-ophthalmology and an MRI of the orbits was ordered.  When results were received patient was advised to go to the ER.  MRI is suggestive of optic neuritis.  Received test, advised patient we will obtain MRI of C-spine.     Patient denied heavy legs, tremors, difficulty walking, involuntary movements, muscle weakness, clumsiness, dizziness, pins-and-needles or slurred speech.  Patient does report stiff muscles for years in addition to knee joint pain for years and reports a spinal injury in the seventh grade but is not clear on what the injury was and recovered after physical therapy.  DISCHARGE DIAGNOSES / PLAN:      Optic Neuritis  -MRI orbits face neck (8/20): Probable demyelinating plaques with suggestion of acute demyelination along the periventricular anterior left frontal lobe mild overall.  Clinical correlation and attention on follow-up advised to exclude cerebral vasculitis versus mass lesion.  Findings suggestive of right optic neuritis  -MRI C-spine (8/22): no  bedside who both express understanding and are in agreement with the current plan.    DIET: regular diet    ACTIVITY: activity as tolerated    DISCHARGE MEDICATIONS:     Medication List        START taking these medications      hydrOXYzine HCl 10 MG tablet  Commonly known as: ATARAX  Take 1 tablet by mouth nightly as needed for Anxiety or Itching     pantoprazole 40 MG tablet  Commonly known as: PROTONIX  Take 1 tablet by mouth every morning (before breakfast)     predniSONE 10 MG tablet  Commonly known as: DELTASONE  Take 6 tablets by mouth daily for 2 days, THEN 4 tablets daily for 2 days, THEN 2 tablets daily for 2 days, THEN 1 tablet daily for 2 days, THEN 0.5 tablets daily for 2 days.  Start taking on: August 27, 2024            CONTINUE taking these medications      diclofenac sodium 1 % Gel  Commonly known as: VOLTAREN     naratriptan 2.5 MG tablet  Commonly known as: AMERGE     pseudoephedrine 120 MG extended release tablet  Commonly known as: SUDAFED 12 HR     rimegepant sulfate 75 MG Tbdp               Where to Get Your Medications        These medications were sent to [facility] #[id number] - [place], [place] - [address] [facility] - P [phone] - F [phone]  [address] [place] [id number]      Phone: [phone]   hydrOXYzine HCl 10 MG tablet  pantoprazole 40 MG tablet       Information about where to get these medications is not yet available    Ask your nurse or doctor about these medications  predniSONE 10 MG tablet       NOTIFY YOUR PHYSICIAN FOR ANY OF THE FOLLOWING:   Fever over 101 degrees for 24 hours.   Chest pain, shortness of breath, fever, chills, nausea, vomiting, diarrhea, change in mentation, falling, weakness, bleeding. Severe pain or pain not relieved by medications.  Or, any other signs or symptoms that you may have questions about.    DISPOSITION:   X Home With:   OT  PT  HH  RN       Long term SNF/Inpatient Rehab    Independent/assisted living    Hospice    Other:     PATIENT

## 2024-08-26 NOTE — TELEPHONE ENCOUNTER
Pt needs a hospital follow up appointment  Provider: Dr. Mancilla, Dr. Seo or PIERRE Lyles NP  Location: TBD  In person or virtual: In person  When: w/in 4 weeks if possible   Diagnosis/reason for follow up:  optic neuritis and suspect new diagnosis MS  Additional information:

## 2024-08-26 NOTE — PROGRESS NOTES
Neurology Progress Note     NAME: Ariadna Ardon   :  1995   MRN:  [id number]   DATE:  2024    Assessment:     Principal Problem:    Optic neuritis  Active Problems:    Steroid-induced hyperglycemia  Resolved Problems:    * No resolved hospital problems. *    Patient is a 29 year-old female with history of migraines who presented 24 following MRI results showing concern for R optic neuritis. She has had 3 weeks history of severe R-eye pain, periocular pain and blurred vision. MRI orbits showed R enhancing optic nerve, enhancing L periventricular lesion, and older R corpus lesions. MRI C-spine and T-spine with no enhancement. Patient received Solumedrol 1000 mg IV once in the ED.     24:  Patient reports improved vision to OD still with \"haze of light\" to upper visual fields. Headache has been resolved over the past few days.      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 Viramontes 24  - Dispo home today     No further recommendations. Please contact if any questions.   Subjective:   Patient and family verbalized understanding of above plan.     Objective:   Chart reviewed since last seen    Current Facility-Administered Medications   Medication Dose Route Frequency    methylPREDNISolone sodium (SOLU-MEDROL) 1,000 mg in sodium chloride 0.9 % 250 mL IVPB ([id number])  1,000 mg IntraVENous  Exam:  General: Well developed well nourished patient in no apparent distress.   Cardiac: Regular rate and rhythm with no murmurs.   Respiratory: No increased WOB  Extremities: 2+ Radial pulses, no cyanosis or edema    Neurological Exam:  Mental Status: Oriented to time, place and person. Speech and language intact. Attention and fund of knowledge appropriate.  Normal recent and remote memory.   Cranial Nerves:   VFF, Mild decreased visual acuity OD. PERRL, EOMI, no nystagmus, no ptosis. Facial sensation is normal. Facial movement is symmetric.  Palate is midline. Tongue is midline. Hearing is intact bilaterally.   Motor:  5/5 strength in upper and lower proximal and distal muscles. Normal bulk and tone. No PD. No tremors   Reflexes:      Sensory:   Intact to LT    Gait:     Cerebellar:           Lab Review   Recent Results (from the past 24 hour(s))   POCT Glucose    Collection Time: 08/25/24 12:09 PM   Result Value Ref Range    POC Glucose 98 65 - 117 mg/dL    Performed by: DEBBIE Priest    POCT Glucose    Collection Time: 08/25/24  4:48 PM   Result Value Ref Range    POC Glucose 149 (H) 65 - 117 mg/dL    Performed by: WALLACE Rios    POCT Glucose    Collection Time: 08/25/24  8:41 PM   Result Value Ref Range    POC Glucose 162 (H) 65 - 117 mg/dL    Performed by: ZAID Allen    POCT Glucose    Collection Time: 08/26/24  6:17 AM   Result Value Ref Range    POC Glucose 117 65 - 117 mg/dL    Performed by: KAREN Jackson        Imaging Review   MRI Result (most recent):  MRI CERVICAL SPINE W WO CONTRAST 08/22/2024    Narrative  EXAM: MRI CERVICAL SPINE W WO CONTRAST    INDICATION: Concern for demyelinating disease. Unspecified optic neuritis    COMPARISON: None    TECHNIQUE: MR imaging of the cervical spine was performed using the following  sequences: sagittal T1, T2, STIR;  axial T2, T1 prior to and following contrast  administration.    CONTRAST: 20 mL of ProHance.    FINDINGS:    There is normal alignment of the

## 2024-08-26 NOTE — PROGRESS NOTES
PHYSICAL THERAPY EVALUATION/DISCHARGE    Patient: Ariadna Ardon (29 y.o. female)  Date: 8/26/2024  Primary Diagnosis: Optic neuritis [H46.9]       Precautions:     ASSESSMENT AND RECOMMENDATIONS:  Based on the objective data below, the patient presents with overall mobility at baseline function s/p admission for optic neuritis.  Pt's only current deficit is blurred vision and sensitivity to bright light.  She was independent with bed mobility, transfers, and ambulation without difficulty.  Pt able to manage environment and avoid obstacles easily.  No further PT needs, will sign off.      Functional Outcome Measure:  The patient scored 24/24 on the [facility] outcome measure.    Further skilled acute physical therapy is not indicated at this time.       PLAN :  Recommendation for discharge: (in order for the patient to meet his/her long term goals):   No skilled physical therapy    Other factors to consider for discharge: independent baseline, cleared by PT    IF patient discharges home will need the following DME: none       SUBJECTIVE:   Patient stated “I'm probably too independent.”    OBJECTIVE DATA SUMMARY:     Past Medical History:   Diagnosis Date    Anxiety     Hypoglycemia      Past Surgical History:   Procedure Laterality Date    WISDOM TOOTH EXTRACTION         Home Situation and Prior Level of Function: Independent, lives with      Strength:    Strength: Within functional limits    Tone & Sensation:   Tone: Normal  Sensation: Intact    Coordination:  Coordination: Within functional limits    Range Of Motion:  AROM: Within functional limits  PROM: Within functional limits    Functional Mobility:  Bed Mobility:  Bed Mobility Training  Overall Level of Assistance: Independent  Transfers:  Transfer Training  Overall Level of Assistance: Independent  Balance:   Balance  Sitting: Intact  Standing: Intact  Ambulation/Gait Training:  Gait  Overall Level of Assistance: Independent

## 2024-08-26 NOTE — DIABETES MGMT
Diabetes Management Team to sign off at this point as patient's blood glucose remains stable while receiving high dose steroid.  No diabetes as evidenced by A1C 5.1%.  Please re-consult us if patient needs change.  Thank you for including us in their care.      TAHMINA MAYS - [facility]   Program for Diabetes Health

## 2024-08-27 ENCOUNTER — TELEPHONE (OUTPATIENT)
Age: 29
End: 2024-08-27

## 2024-08-27 RX ORDER — PREDNISONE 10 MG/1
TABLET ORAL
Qty: 10 TABLET | Refills: 0 | Status: SHIPPED | OUTPATIENT
Start: 2024-08-27 | End: 2024-09-06

## 2024-08-28 NOTE — PROGRESS NOTES
8/27 22:55   RN Evelyn Clark answered phone call starting with 121 in regards to PT Miles.     Pt mother requested to know if Rx sent to [facility] pharmacy correctly. RN called [facility] pharmacy #[id number]. Pharmacist picked up call and reported to RN that the Pharmacy did not receive Rx, however upon investigation, Pt MAR reflected correct Rx order to correct pharmacy. RN gave Pt Rx verbally over the phone w/readback. Pharmacist confirmed. RN contacted mother of Pt to verbalize that the order had been given directly to the [facility] Pharmacist.

## 2024-08-29 NOTE — TELEPHONE ENCOUNTER
Called and spoke to the Pt. Let her know Deltasone was sent in 8/27/2024 pt stated she received the medication.

## 2024-09-16 ENCOUNTER — OFFICE VISIT (OUTPATIENT)
Age: 29
End: 2024-09-16
Payer: COMMERCIAL

## 2024-09-16 VITALS
OXYGEN SATURATION: 98 % | HEIGHT: 64 IN | HEART RATE: 109 BPM | DIASTOLIC BLOOD PRESSURE: 70 MMHG | WEIGHT: 235 LBS | SYSTOLIC BLOOD PRESSURE: 122 MMHG | BODY MASS INDEX: 40.12 KG/M2

## 2024-09-16 DIAGNOSIS — G35 RELAPSING REMITTING MULTIPLE SCLEROSIS (HCC): Primary | ICD-10-CM

## 2024-09-16 DIAGNOSIS — H46.9 OPTIC NEURITIS, RIGHT: ICD-10-CM

## 2024-09-16 PROCEDURE — 99215 OFFICE O/P EST HI 40 MIN: CPT | Performed by: PSYCHIATRY & NEUROLOGY

## 2024-09-16 RX ORDER — GLATIRAMER 40 MG/ML
40 INJECTION, SOLUTION SUBCUTANEOUS
Qty: 13 ML | Refills: 11 | Status: ACTIVE | OUTPATIENT
Start: 2024-09-16

## 2024-09-16 RX ORDER — DICLOFENAC SODIUM 75 MG/1
75 TABLET, DELAYED RELEASE ORAL 2 TIMES DAILY
COMMUNITY

## 2024-09-16 RX ORDER — HYDROXYZINE HYDROCHLORIDE 10 MG/1
10 TABLET, FILM COATED ORAL 3 TIMES DAILY PRN
COMMUNITY

## 2024-09-16 ASSESSMENT — PATIENT HEALTH QUESTIONNAIRE - PHQ9
SUM OF ALL RESPONSES TO PHQ9 QUESTIONS 1 & 2: 0
1. LITTLE INTEREST OR PLEASURE IN DOING THINGS: NOT AT ALL
SUM OF ALL RESPONSES TO PHQ QUESTIONS 1-9: 0
2. FEELING DOWN, DEPRESSED OR HOPELESS: NOT AT ALL

## 2024-09-17 LAB
25(OH)D3+25(OH)D2 SERPL-MCNC: 19.2 NG/ML (ref 30–100)
ERYTHROCYTE [SEDIMENTATION RATE] IN BLOOD BY WESTERGREN METHOD: 9 MM/HR (ref 0–32)
HIV 1+2 AB+HIV1 P24 AG SERPL QL IA: NON REACTIVE
RPR SER QL: NON REACTIVE
TSH SERPL DL<=0.005 MIU/L-ACNC: 1.15 UIU/ML (ref 0.45–4.5)
VIT B12 SERPL-MCNC: 564 PG/ML (ref 232–1245)
VZV IGG SER IA-ACNC: 170 INDEX
VZV IGM SER QL IA: <0.91 INDEX (ref 0–0.9)

## 2024-09-18 ENCOUNTER — TELEPHONE (OUTPATIENT)
Age: 29
End: 2024-09-18

## 2024-09-19 LAB — COPPER SERPL-MCNC: 129 UG/DL (ref 80–158)

## 2024-09-19 RX ORDER — CONTAINER,EMPTY
1 EACH MISCELLANEOUS PRN
Qty: 1 EACH | Refills: 3 | Status: SHIPPED | OUTPATIENT
Start: 2024-09-19

## 2024-09-19 RX ORDER — BLOOD PRESSURE TEST KIT
KIT MISCELLANEOUS
Qty: 1 EACH | Refills: 0 | Status: SHIPPED | OUTPATIENT
Start: 2024-09-19

## 2024-09-20 LAB — ANA SER QL IF: NEGATIVE

## 2025-04-11 ENCOUNTER — TELEPHONE (OUTPATIENT)
Age: 30
End: 2025-04-11

## 2025-04-11 NOTE — TELEPHONE ENCOUNTER
----- Message from Dr. Sonia Seo,  sent at 4/9/2025  1:57 PM EDT -----  Regarding: RE: appt. 4/15/2025  Keep f/u. Please have her schedule MRI. RMD  ----- Message -----  From: Tiera Davis LPN  Sent: 4/9/2025   1:49 PM EDT  To: Sonia Seo,   Subject: appt. 4/15/2025                                  MRI brain not completed ok to keep fu appt?

## 2025-04-11 NOTE — TELEPHONE ENCOUNTER
Called and spoke with the Pt. Asking her to call and get MRI scheduled. She states she has called multiple times and has to leave a message gave her the number again and she will try to call again.

## 2025-04-15 ENCOUNTER — OFFICE VISIT (OUTPATIENT)
Age: 30
End: 2025-04-15
Payer: COMMERCIAL

## 2025-04-15 VITALS
OXYGEN SATURATION: 98 % | HEIGHT: 64 IN | SYSTOLIC BLOOD PRESSURE: 136 MMHG | WEIGHT: 238 LBS | BODY MASS INDEX: 40.63 KG/M2 | HEART RATE: 127 BPM | DIASTOLIC BLOOD PRESSURE: 82 MMHG

## 2025-04-15 DIAGNOSIS — R20.2 PARESTHESIA: ICD-10-CM

## 2025-04-15 DIAGNOSIS — H46.9 OPTIC NEURITIS, RIGHT: ICD-10-CM

## 2025-04-15 DIAGNOSIS — G35 RELAPSING REMITTING MULTIPLE SCLEROSIS (HCC): Primary | ICD-10-CM

## 2025-04-15 PROCEDURE — 99214 OFFICE O/P EST MOD 30 MIN: CPT | Performed by: PSYCHIATRY & NEUROLOGY

## 2025-04-15 RX ORDER — GLATIRAMER 40 MG/ML
40 INJECTION, SOLUTION SUBCUTANEOUS
Qty: 13 ML | Refills: 11 | Status: ACTIVE | OUTPATIENT
Start: 2025-04-16

## 2025-04-15 NOTE — PROGRESS NOTES
normal alignment of the thoracic spine. Vertebral body heights are  maintained. Marrow signal is normal.    The course, caliber, and signal intensity of the spinal cord are normal. There  is no pathologic intrathecal enhancement.    The paraspinal soft tissues are within normal limits.    There is no herniation or stenosis.    Impression  Unremarkable visualized thoracic cord. Unremarkable MRI of the thoracic spine.    Electronically signed by BRISEYDA EARLY      MRI ORBITS FACE NECK W WO CONTRAST 08/20/2024    Narrative  EXAM: MRI ORBITS FACE NECK W WO CONTRAST    INDICATION: 29-year-old female with unspecified papilledema; Ocular pain, right  eye.    COMPARISON: None.    CONTRAST: 20 mL ProHance.    TECHNIQUE:  Multiplanar multisequence acquisition of the brain prior to and following  uneventful IV contrast administration. Additional sequences with particular  focus on the orbits were also acquired.    FINDINGS:  Focal T2/FLAIR and diffusion hyperintense lesion without significant T1 signal  abnormality or contrast enhancement along the periventricular posterior right  frontal lobe, indeterminate. Additional masslike intensely T2  hyperintense/T2-FLAIR hyperintense periventricular lesion along anterior horn  left lateral ventricle and anterior to the left caudate head (image 12 of series  4) with associated blackhole on T1-weighted image measuring up to 1.3 x 0.9 cm.  Associated thick peripheral mildly hyperintense diffusion signal abnormality and  contrast enhancement present and may suggest active demyelinating plaque.  There is no acute infarct, hemorrhage, extra-axial fluid collection, or mass  effect. The ventricles are normal in size and position.  There is no cerebellar  tonsillar herniation. Expected arterial flow-voids are present. The dural venous  sinuses are patent and without filling defect to suggest sinus thrombosis. Basal  CSF cisterns are patent.  No apparent mass or enhancing lesion.    The globes

## 2025-05-07 ENCOUNTER — HOSPITAL ENCOUNTER (OUTPATIENT)
Facility: HOSPITAL | Age: 30
Discharge: HOME | End: 2025-05-10
Attending: PSYCHIATRY & NEUROLOGY
Payer: COMMERCIAL

## 2025-05-07 DIAGNOSIS — R20.2 PARESTHESIA: ICD-10-CM

## 2025-05-07 DIAGNOSIS — G35 RELAPSING REMITTING MULTIPLE SCLEROSIS (HCC): ICD-10-CM

## 2025-05-07 PROCEDURE — 72156 MRI NECK SPINE W/O & W/DYE: CPT

## 2025-05-07 PROCEDURE — A9579 GAD-BASE MR CONTRAST NOS,1ML: HCPCS | Performed by: PSYCHIATRY & NEUROLOGY

## 2025-05-07 PROCEDURE — 6360000004 HC RX CONTRAST MEDICATION: Performed by: PSYCHIATRY & NEUROLOGY

## 2025-05-07 PROCEDURE — 70553 MRI BRAIN STEM W/O & W/DYE: CPT

## 2025-05-07 RX ADMIN — GADOTERIDOL 20 ML: 279.3 INJECTION, SOLUTION INTRAVENOUS at 17:12

## 2025-05-12 ENCOUNTER — RESULTS FOLLOW-UP (OUTPATIENT)
Age: 30
End: 2025-05-12

## 2025-05-12 NOTE — TELEPHONE ENCOUNTER
----- Message from Dr. Sonia Seo DO sent at 5/12/2025  2:49 PM EDT -----  MRI Brain/Cervical spine appears stable without new/active demyelination. RMD  ----- Message -----  From: Daysi Mart Incoming Orders Results To Radiant  Sent: 5/7/2025   8:50 PM EDT  To: Sonia Seo DO

## 2025-08-25 ENCOUNTER — TELEPHONE (OUTPATIENT)
Age: 30
End: 2025-08-25